# Patient Record
Sex: MALE | Race: AMERICAN INDIAN OR ALASKA NATIVE
[De-identification: names, ages, dates, MRNs, and addresses within clinical notes are randomized per-mention and may not be internally consistent; named-entity substitution may affect disease eponyms.]

---

## 2017-09-29 ENCOUNTER — HOSPITAL ENCOUNTER (INPATIENT)
Dept: HOSPITAL 42 - ED | Age: 52
LOS: 2 days | Discharge: NURSING, REHAB OR LONG-TERM CARE | DRG: 544 | End: 2017-10-01
Attending: HOSPITALIST | Admitting: INTERNAL MEDICINE
Payer: MEDICAID

## 2017-09-29 VITALS — BODY MASS INDEX: 25.4 KG/M2

## 2017-09-29 DIAGNOSIS — I13.2: Primary | ICD-10-CM

## 2017-09-29 DIAGNOSIS — E11.22: ICD-10-CM

## 2017-09-29 DIAGNOSIS — I27.20: ICD-10-CM

## 2017-09-29 DIAGNOSIS — E83.39: ICD-10-CM

## 2017-09-29 DIAGNOSIS — I16.1: ICD-10-CM

## 2017-09-29 DIAGNOSIS — D64.9: ICD-10-CM

## 2017-09-29 DIAGNOSIS — Z79.82: ICD-10-CM

## 2017-09-29 DIAGNOSIS — Z79.01: ICD-10-CM

## 2017-09-29 DIAGNOSIS — I50.21: ICD-10-CM

## 2017-09-29 DIAGNOSIS — E78.00: ICD-10-CM

## 2017-09-29 DIAGNOSIS — N25.81: ICD-10-CM

## 2017-09-29 DIAGNOSIS — N18.6: ICD-10-CM

## 2017-09-29 DIAGNOSIS — Z79.02: ICD-10-CM

## 2017-09-29 DIAGNOSIS — M89.9: ICD-10-CM

## 2017-09-29 DIAGNOSIS — I42.0: ICD-10-CM

## 2017-09-29 DIAGNOSIS — Z99.2: ICD-10-CM

## 2017-09-29 DIAGNOSIS — I34.0: ICD-10-CM

## 2017-09-29 DIAGNOSIS — I25.10: ICD-10-CM

## 2017-09-29 LAB
ALBUMIN/GLOB SERPL: 1.1 {RATIO} (ref 1.1–1.8)
ALP SERPL-CCNC: 114 U/L (ref 38–126)
ALT SERPL-CCNC: 14 U/L (ref 7–56)
APTT BLD: 58 SECONDS (ref 23.7–30.8)
AST SERPL-CCNC: 30 U/L (ref 17–59)
BASOPHILS # BLD AUTO: 0.03 K/MM3 (ref 0–2)
BASOPHILS NFR BLD: 0.5 % (ref 0–3)
BILIRUB SERPL-MCNC: 0.9 MG/DL (ref 0.2–1.3)
BUN SERPL-MCNC: 42 MG/DL (ref 7–21)
CALCIUM SERPL-MCNC: 10.6 MG/DL (ref 8.4–10.5)
CHLORIDE SERPL-SCNC: 97 MMOL/L (ref 98–107)
CO2 SERPL-SCNC: 31 MMOL/L (ref 21–33)
EOSINOPHIL # BLD: 0.1 10*3/UL (ref 0–0.7)
EOSINOPHIL NFR BLD: 1.5 % (ref 1.5–5)
ERYTHROCYTE [DISTWIDTH] IN BLOOD BY AUTOMATED COUNT: 23.2 % (ref 11.5–14.5)
GLOBULIN SER-MCNC: 4 GM/DL
GLUCOSE SERPL-MCNC: 81 MG/DL (ref 70–110)
GRANULOCYTES # BLD: 5.05 10*3/UL (ref 1.4–6.5)
GRANULOCYTES NFR BLD: 81.7 % (ref 50–68)
HCT VFR BLD CALC: 28.8 % (ref 42–52)
INR PPP: 3.68 (ref 0.93–1.08)
LIPASE SERPL-CCNC: 12 U/L (ref 23–300)
LYMPHOCYTES # BLD: 0.4 10*3/UL (ref 1.2–3.4)
LYMPHOCYTES NFR BLD AUTO: 6.1 % (ref 22–35)
MCH RBC QN AUTO: 24 PG (ref 25–35)
MCHC RBC AUTO-ENTMCNC: 32.3 G/DL (ref 31–37)
MCV RBC AUTO: 74.4 FL (ref 80–105)
MONOCYTES # BLD AUTO: 0.6 10*3/UL (ref 0.1–0.6)
MONOCYTES NFR BLD: 10.2 % (ref 1–6)
PLATELET # BLD: 111 10^3/UL (ref 120–450)
POTASSIUM SERPL-SCNC: 4.2 MMOL/L (ref 3.6–5)
PROT SERPL-MCNC: 8.2 G/DL (ref 5.8–8.3)
SODIUM SERPL-SCNC: 143 MMOL/L (ref 132–148)
TROPONIN I SERPL-MCNC: 0.12 NG/ML
WBC # BLD AUTO: 6.2 10^3/UL (ref 4.5–11)

## 2017-09-29 PROCEDURE — 5A1D70Z PERFORMANCE OF URINARY FILTRATION, INTERMITTENT, LESS THAN 6 HOURS PER DAY: ICD-10-PCS

## 2017-09-29 NOTE — RAD
HISTORY:

HTN  



COMPARISON:

01/02/2014 



FINDINGS:



LUNGS:

There is severe pulmonary venous congestion and prominent central 

vasculature. There is also interstitial pulmonary edema. 



PLEURA:

Suspect small pleural effusions, no pneumothorax apparent. There is 

fluid in the minor fissure. 



CARDIOVASCULAR:

There is persistent moderate cardiomegaly.



OSSEOUS STRUCTURES:

No significant abnormalities.



VISUALIZED UPPER ABDOMEN:

Normal.



OTHER FINDINGS:

None.



IMPRESSION:

Findings are most compatible with mild congestive heart failure.

## 2017-09-29 NOTE — CT
PROCEDURE:  CT HEAD WITHOUT CONTRAST.



HISTORY:

headache, HTN



COMPARISON:

01/02/2014. 



TECHNIQUE:

Axial computed tomography images were obtained through the head/brain 

without intravenous contrast.  



Radiation dose:



Total exam DLP = 1972.94 mGy-cm.



This CT exam was performed using one or more of the following dose 

reduction techniques: Automated exposure control, adjustment of the 

mA and/or kV according to patient size, and/or use of iterative 

reconstruction technique.



FINDINGS:



HEMORRHAGE:

No intracranial hemorrhage. 



BRAIN:

Again seen are subcortical calcifications in the frontal lobes, more 

conspicuous on the right. There are also several calcifications in 

bilateral basal ganglia and in the dentate nuclei of cerebellar 

hemispheres. There is no mass, mass effect or abnormal extra-axial 

fluid collection.  There are confluent hypodensities in the 

periventricular white matter.



VENTRICLES:

There is moderate age-related global parenchymal volume loss and 

proportionate enlargement of the ventricles and cortical sulci. 



CALVARIUM:

The skull base and calvarium are normal.



PARANASAL SINUSES:

There is mild polypoid mucosal thickening in the maxillary sinuses. 

The remaining included paranasal sinuses are predominantly clear.



MASTOID AIR CELLS:

Predominantly clear.



OTHER FINDINGS:

None.



IMPRESSION:

Confluent periventricular white matter changes are nonspecific and 

could represent moderate chronic microangiopathic changes. Moderate 

age-related global parenchymal volume loss.



Stable subcortical calcifications, worse in the right frontal lobe, 

and stable calcifications in bilateral basal ganglia and dentate 

nuclei of cerebellar hemispheres.

## 2017-09-29 NOTE — CP.PCM.HP
<Glen Bang - Last Filed: 09/29/17 20:17>





History of Present Illness





- History of Present Illness


History of Present Illness: 





Subjective:


Patient is a 52 year old male whose past medical history includes hypertension, 

hyperlipidemia, chronic kidney disease on HD, and anemia was sent here from 

[facility] for evaluation and treatment of elevated blood 

pressures. Patient reports experiencing left lower extremity pain near catheter 

site. Also states he has mild nonradiating lower abdominal pain which is dull 

in nature. Admits to emesis which he states was orange in color x 2. Patient 

denies f/c/ha/dizziness/cp/sob/n/constipation/diarrhea/urinary symptoms.








PMHx:hypertension, hyperlipidemia, chronic kidney disease, and anemia


PSHx: AV fistulas bilateral upper extremities , dialysis catheter left lower 

extremity. 


Allergies: NKDA


Social HX: denies ETOH, denies tobacco, denies illicit drug use


Family Hx: noncontributory


PMD: Dr. Webb











Physical Examination:





Constitutional: No acute distress.


Head: Normocephalic.  Atraumatic.  


Eyes:  Bilateral eye discharge.


ENT:  Moist mucous membranes.


Neck:  Supple.


Cardiovascular:  Regular rate. No murmurs. +s1 +s2


Chest: No tenderness.


Respiratory:  coarse breath sounds bibasilar bases


GI:  mild abdominal tenderness in the lower quadrants without guarding or 

rebound.


Back:  No CVA tendernes


Musculoskeletal:  Left arm edema. Large mass at right antecubital fossa, 

pulsatile, patient identifies as fistula


Skin:  No rash. 


Neurologic:  Alert, no focal deficit, responds to verbal stimuli, moves 

extremities past midline











Assessment and Plan:





Patient is a 52 year old male whose past medical history includes hypertension, 

hyperlipidemia, chronic kidney disease on HD, and anemia was sent here from 

[facility] for evaluation and treatment of elevated blood pressures.





Hypertensive Urgency


- continue hydralazine IV PRN, catapres, nifidiine, valsartan with holding 

parameters 


- BP in ED started 200s/ 120s down to 180s/80s after being given nitro





ESRD on HD


- avoid nephrotoxins


- ok for ACEi/ARB as patient is already on HD


- urine lytes


- Nephrology Consult- Dr. Fox


- continue HD has per nephro recs





Acute Exacerbation of CHF


- CXR ordered by ED- reviewed Findings are most compatible with mild congestive 

heart failure


- BNP in the 68,000


- patient receiving dialysis on admit date, fluid will be taken off


- hold on lasix at this time as patient is not SOB nor were there crackles 

heard on auscultation


- consult cardiology- Dr. Nergo





Vomitting


- resolved as per patient


- will monitor H and H as the emesis was described as orange


- will add zofran prn if patient c/o nausea





Hx of Hypertension


- BP in ED started 200s/ 120s down to 180s/80s after giving nitro


- continue home nifidipine, hydralazine, clonidine, carvedilol


- continue home valsartan for now as patient is on HD





Hx of Hyperlipidemia


- continue home statin





Supratherapeutic INR


- hold home coumadin





Questionable migraine vs seizures


- continue divalproex sodium 





PPX


- subq heparin


- protonix 





- Plan discussed with and approved by attending physician, Dr. Banda. 








Present on Admission





- Present on Admission


Any Indicators Present on Admission: No





Past Patient History





- Tetanus Immunizations


Tetanus Immunization: Unknown





- Past Medical History & Family History


Past Medical History?: Yes





- Past Social History


Smoking Status: Never Smoked





- CARDIAC


Hx Cardiac Disorders: Yes


Hx Congestive Heart Failure: Yes


Hx Hypertension: Yes





- PULMONARY


Hx Respiratory Disorders: No





- NEUROLOGICAL


Hx Neurological Disorder: Yes


Hx Seizures: Yes





- HEENT


Hx HEENT Problems: No





- RENAL


Hx Chronic Kidney Disease: Yes


Hx Dialysis: Yes


Date of Last Dialysis Treatment: 09/27/17


Hx Renal Failure: Yes (ESRD, CKD, on HD for past 30 years)





- ENDOCRINE/METABOLIC


Hx Endocrine Disorders: Yes


Hx Diabetes Mellitus Type 2: Yes





- HEMATOLOGICAL/ONCOLOGICAL


Hx Blood Disorders: Yes


Hx Anemia: Yes





- INTEGUMENTARY


Hx Dermatological Problems: No





- MUSCULOSKELETAL/RHEUMATOLOGICAL


Hx Musculoskeletal Disorders: Yes


Hx Falls: Yes


Other/Comment: lt arm swelling





- GASTROINTESTINAL


Hx Gastrointestinal Disorders: Yes


Hx Diverticulitis: Yes





- GENITOURINARY/GYNECOLOGICAL


Hx Genitourinary Disorders: No





- PSYCHIATRIC


Hx Psychophysiologic Disorder: No


Hx Substance Use: No





- SURGICAL HISTORY


Hx Cardiac Catheterization: Yes


Hx Vascular Access Device: Yes


Other/Comment: right upper arm and left forearm av shunts not in use, pt has 

left chest wall permacath for dialysis





- ANESTHESIA


Hx Anesthesia: Yes


Hx Anesthesia Reactions: No


Hx Malignant Hyperthermia: No





Meds


Allergies/Adverse Reactions: 


 Allergies











Allergy/AdvReac Type Severity Reaction Status Date / Time


 


No Known Allergies Allergy   Verified 09/29/17 12:08














Results





- Vital Signs


Recent Vital Signs: 





 Last Vital Signs











Temp  98.2 F   09/29/17 12:01


 


Pulse  72   09/29/17 18:03


 


Resp  18   09/29/17 18:03


 


BP  185/89 H  09/29/17 18:03


 


Pulse Ox  100   09/29/17 18:03














- Labs


Result Diagrams: 


 09/29/17 13:03





 09/29/17 13:03


Labs: 





 Laboratory Results - last 24 hr











  09/29/17 09/29/17 09/29/17





  13:03 13:03 13:03


 


WBC   6.2 


 


RBC   3.87 


 


Hgb   9.3 L 


 


Hct   28.8 L 


 


MCV   74.4 L 


 


MCH   24.0 L 


 


MCHC   32.3 


 


RDW   23.2 H 


 


Plt Count   111 L 


 


Gran %   81.7 H 


 


Lymph % (Auto)   6.1 L 


 


Mono % (Auto)   10.2 H 


 


Eos % (Auto)   1.5 


 


Baso % (Auto)   0.5 


 


Gran #   5.05 


 


Lymph #   0.4 L 


 


Mono #   0.6 


 


Eos #   0.1 


 


Baso #   0.03 


 


PT    39.7 H*


 


INR    3.68 H*


 


APTT    58.0 H


 


Sodium  143  


 


Potassium  4.2  


 


Chloride  97 L  


 


Carbon Dioxide  31  


 


Anion Gap  19  


 


BUN  42 H  


 


Creatinine  6.7 H  


 


Est GFR ( Amer)  11  


 


Est GFR (Non-Af Amer)  9  


 


Random Glucose  81  


 


Calcium  10.6 H  


 


Total Bilirubin  0.9  


 


AST  30  


 


ALT  14  


 


Alkaline Phosphatase  114  


 


Troponin I  0.12  D  


 


NT-Pro-B Natriuret Pep  34542 H  


 


Total Protein  8.2  


 


Albumin  4.2  


 


Globulin  4.0  


 


Albumin/Globulin Ratio  1.1  


 


Lipase  12 L  














<GloverVeronika N - Last Filed: 09/30/17 23:15>





Results





- Vital Signs


Recent Vital Signs: 





 Last Vital Signs











Temp  98.9 F   09/30/17 11:59


 


Pulse  92 H  09/30/17 22:00


 


Resp  18   09/30/17 11:59


 


BP  122/73   09/30/17 19:05


 


Pulse Ox  98   09/30/17 06:00














- Labs


Result Diagrams: 


 09/30/17 06:00





 09/30/17 06:00


Labs: 





 Laboratory Results - last 24 hr











  09/30/17 09/30/17





  06:00 06:00


 


WBC  4.5  D 


 


RBC  3.54 


 


Hgb  8.2 L 


 


Hct  25.8 L 


 


MCV  72.9 L 


 


MCH  23.2 L 


 


MCHC  31.8 


 


RDW  22.1 H 


 


Plt Count  128 


 


Gran %  65.7 


 


Lymph % (Auto)  14.7 L 


 


Mono % (Auto)  14.9 H 


 


Eos % (Auto)  4.0 


 


Baso % (Auto)  0.7 


 


Gran #  2.96 


 


Lymph #  0.7 L 


 


Mono #  0.7 H 


 


Eos #  0.2 


 


Baso #  0.03 


 


Sodium   142


 


Potassium   4.1


 


Chloride   97 L


 


Carbon Dioxide   33


 


Anion Gap   16


 


BUN   37 H


 


Creatinine   5.7 H


 


Est GFR ( Amer)   13


 


Est GFR (Non-Af Amer)   10


 


Random Glucose   103


 


Calcium   9.7


 


Phosphorus   4.1


 


Magnesium   2.0


 


Total Bilirubin   1.0


 


AST   22


 


ALT   18


 


Alkaline Phosphatase   94


 


Troponin I   0.14 H*


 


Total Protein   7.3


 


Albumin   3.7


 


Globulin   3.6


 


Albumin/Globulin Ratio   1.0 L


 


Triglycerides   97


 


Cholesterol   122 L


 


LDL Cholesterol Direct   37


 


HDL Cholesterol   36

## 2017-09-29 NOTE — ED PDOC
Arrival/HPI





- General


Chief Complaint: Headache


Time Seen by Provider: 09/29/17 12:01


Historian: Patient





- History of Present Illness


Narrative History of Present Illness (Text): 


09/29/17 12:04


A 52 year old male, whose past medical history includes hypertension, 

hyperlipidemia, chronic kidney disease, and anemia, was sent here from [facility] for hypertension evaluation. NH staff report patient was given flu shot 

yesterday and today was noted to have rhinorrhea, severe Hypertension. Patiet 

reports shortness of breath. Patient due for dialysis today. 





PMD: Dr. Webb





Time/Duration: Prior to Arrival


Symptom Onset: Gradual


Symptom Course: Unchanged





Past Medical History





- Provider Review


Nursing Documentation Reviewed: Yes





- Tetanus Immunization


Tetanus Immunization: Unknown





- Cardiac


Hx Cardiac Disorders: Yes


Hx Congestive Heart Failure: Yes


Hx Hypertension: Yes





- Pulmonary


Hx Respiratory Disorders: No





- Neurological


Hx Neurological Disorder: Yes


Hx Seizures: Yes





- HEENT


Hx HEENT Disorder: No





- Renal


Hx Renal Disorder: Yes


Hx Dialysis: Yes


Date of Last Dialysis Treatment: 09/27/17


Hx Renal Failure: Yes (ESRD, CKD, on HD for past 30 years)





- Endocrine/Metabolic


Hx Endocrine Disorders: Yes


Hx Diabetes Mellitus Type 2: Yes





- Hematological/Oncological


Hx Blood Disorders: Yes


Hx Anemia: Yes





- Integumentary


Hx Dermatological Disorder: No





- Musculoskeletal/Rheumatological


Hx Musculoskeletal Disorders: Yes


Hx Falls: Yes


Other/Comment: lt arm swelling





- Gastrointestinal


Hx Gastrointestinal Disorders: Yes


Hx Diverticulitis: Yes





- Genitourinary/Gynecological


Hx Genitourinary Disorders: No





- Psychiatric


Hx Psychophysiologic Disorder: No


Hx Substance Use: No





- Past Surgical History


Past Surgical History: Unable to Obtain





- Surgical History


Hx Cardiac Catheterization: Yes


Hx Vascular Access Device: Yes


Other/Comment: right upper arm and left forearm av shunts not in use, pt has 

left chest wall permacath for dialysis





- Anesthesia


Hx Anesthesia: Yes


Hx Anesthesia Reactions: No


Hx Malignant Hyperthermia: No





- Suicidal Assessment


Feels Threatened In Home Enviroment: No





Family/Social History





- Physician Review


Nursing Documentation Reviewed: Yes


Family/Social History: No Known Family HX


Smoking Status: Never Smoked


Hx Alcohol Use: No


Hx Substance Use: No





Allergies/Home Meds


Allergies/Adverse Reactions: 


Allergies





No Known Allergies Allergy (Verified 09/29/17 12:08)


 








Home Medications: 


 Home Meds











 Medication  Instructions  Recorded  Confirmed


 


Aspirin [Aspirin EC] 81 mg PO DAILY 09/21/15 09/29/17


 


Atorvastatin [Lipitor] 20 mg PO DIN 09/21/15 09/29/17


 


Carvedilol [Coreg] 25 mg PO BID 09/21/15 09/29/17


 


Clopidogrel [Plavix] 75 mg PO DAILY 09/21/15 09/29/17


 


Amino Acids/Protein Hydr/Fiber 30 ml PO BID 09/29/17 09/29/17





[Pro-Stat Renal Care Liquid]   


 


Calcium Acetate [Phoslo] 2 cap PO TID 09/29/17 09/29/17


 


Divalproex Sodium [Divalproex 1 cap PO BID 09/29/17 09/29/17





Sodium]   


 


Famotidine [Pepcid] 20 mg PO DAILY 09/29/17 09/29/17


 


NIFEdipine ER [Procardia XL] 1 tab PO DAILY 09/29/17 09/29/17


 


Valsartan [Diovan] 320 mg PO DAILY 09/29/17 09/29/17


 


Warfarin Sodium [Coumadin] 6 mg PO DAILY 09/29/17 09/29/17


 


cloNIDine [Catapres] 0.1 mg PO BID 09/29/17 09/29/17


 


hydrALAZINE [Apresoline] 100 mg PO BID 09/29/17 09/29/17














Review of Systems





- Physician Review


All systems were reviewed & negative as marked: Yes





- Review of Systems


Constitutional: absent: Fevers


Cardiovascular: absent: Chest Pain





Physical Exam





- Physical Exam


Narrative Physical Exam (Text): 





Constitutional: No acute distress.


Head: Normocephalic.  Atraumatic.  


Eyes:  Bilateral eye discharge.


ENT:  Moist mucous membranes.


Neck:  Supple.


Cardiovascular:  Regular rate. No murmurs.


Chest: No tenderness.


Respiratory:  Clear to auscultation bilaterally.


GI:  Diffuse abdomen tenderness without guarding or rebound.


Back:  No CVA tenderness.


Musculoskeletal:  Left arm edema. Large mass at right antecubital fossa, 

pulsatile, patient identifies as fistula.


Skin:  No rash. 


Neurologic:  Alert, no focal deficit.








Vital Signs Reviewed: Yes


Vital Signs











  Temp Pulse Resp BP Pulse Ox


 


 09/29/17 18:03   72  18  185/89 H  100


 


 09/29/17 16:40   77  18  188/93 H  96


 


 09/29/17 15:33   87  20  196/102 H  100


 


 09/29/17 14:11   82  18  200/124 H  93 L


 


 09/29/17 12:01  98.2 F  85  18  204/130 H  95











Temperature: Afebrile


Blood Pressure: Hypertensive


Pulse: Regular


Respiratory Rate: Normal


Appearance: Positive for: Well-Appearing


Pain Distress: None


Mental Status: Positive for: Alert and Oriented X 3





Medical Decision Making


ED Course and Treatment: 


09/29/17 12:09


Impression:  52 year old male brought in for hypertension evaluation and is 

complaining of abdominal pain. Physical exam shows left arm edema; large mass 

at right antecubital fossa, pulsatile, patient identifies as fistula; diffused 

abdominal tenderness without guarding or rebound; no murmurs; bilateral eye 

discharge.





Plan:


-- EKG


-- Head CT


-- Chest X-ray 


-- Labs


-- Reassess and disposition





Prior Visits:


Notes and results from previous visits were reviewed. Patient was last seen on 

09/21/2015 for fever. Patient was admitted.





Progress Notes:


EKG:


Ordered, reviewed, and independently interpreted the EKG.


Rate : 85 BPM


Rhythm : NSR


Interpretation : No ST-segment elevations, rightward axis ST depression; 2, 3 

AVMs.


Comparison : No previous EKG for comparison.





09/29/2017 12:27


Chest X-ray


IMPRESSION: Findings are most compatible with mild congestive heart failure.


Dictator: Iris Judge MD








09/29/2017 13:40


Head CT


IMPRESSION: 


Confluent perventricular white matter changes are nospecific and could 

represent moderate chronic microangiopathic changes. Moderate age-related 

global parenchymal volume loss. 


Stable subcortical calcifications, worse in the right frontal lobe, and stable 

calcifications in bilateral basal ganglia and dentate nuclei of cerebellar 

hemispheres.


Dictator: Iris Rodgers approved patient transport to [facility], recommends hospitalist for 

admission. Dr. Ribeiro accepts to hospitalist service. Dr. Fox consulted for 

renal, arranged for dialysis at bedside in emergency department.








- Lab Interpretations


Lab Results: 








 09/29/17 13:03 





 09/29/17 13:03 





 Lab Results





09/29/17 13:03: PT 39.7 H*, INR 3.68 H*, APTT 58.0 H


09/29/17 13:03: WBC 6.2, RBC 3.87, Hgb 9.3 L, Hct 28.8 L, MCV 74.4 L, MCH 24.0 L

, MCHC 32.3, RDW 23.2 H, Plt Count 111 L, Gran % 81.7 H, Lymph % (Auto) 6.1 L, 

Mono % (Auto) 10.2 H, Eos % (Auto) 1.5, Baso % (Auto) 0.5, Gran # 5.05, Lymph # 

0.4 L, Mono # 0.6, Eos # 0.1, Baso # 0.03


09/29/17 13:03: Sodium 143, Potassium 4.2, Chloride 97 L, Carbon Dioxide 31, 

Anion Gap 19, BUN 42 H, Creatinine 6.7 H, Est GFR ( Amer) 11, Est GFR (

Non-Af Amer) 9, Random Glucose 81, Calcium 10.6 H, Total Bilirubin 0.9, AST 30, 

ALT 14, Alkaline Phosphatase 114, Troponin I 0.12  D, NT-Pro-B Natriuret Pep 

06966 H, Total Protein 8.2, Albumin 4.2, Globulin 4.0, Albumin/Globulin Ratio 

1.1, Lipase 12 L








I have reviewed the lab results: Yes





- RAD Interpretation


Radiology Orders: 








09/29/17 12:02


HEAD W/O CONTRAST [CT] Stat 


CHEST PORTABLE [RAD] Stat 














- Medication Orders


Current Medication Orders: 











Discontinued Medications





Nitroglycerin (Nitrostat Sl Tab)  0.4 mg SL STAT STA


   Stop: 09/29/17 12:32


   Last Admin: 09/29/17 13:00  Dose: 0.4 mg











- Scribe Statement


The provider has reviewed the documentation as recorded by the Russ Hassan





Provider Scribe Attestation:


All medical record entries made by the Russ were at my direction and 

personally dictated by me. I have reviewed the chart and agree that the record 

accurately reflects my personal performance of the history, physical exam, 

medical decision making, and the department course for this patient. I have 

also personally directed, reviewed, and agree with the discharge instructions 

and disposition.











Disposition/Present on Arrival





- Present on Arrival


Any Indicators Present on Arrival: No


History of DVT/PE: No


History of Uncontrolled Diabetes: No


Urinary Catheter: No


History of Decub. Ulcer: No


History Surgical Site Infection Following: None





- Disposition


Have Diagnosis and Disposition been Completed?: Yes


Diagnosis: 


 Fluid overload, Hypertensive emergency





Disposition: HOSPITALIZED


Disposition Time: 13:51


Patient Plan: Admission, Telemetry


Condition: GUARDED


Referrals: 


Laura Gallo MD [Primary Care Provider] - Follow up with primary


Forms:  [employer] (English)

## 2017-09-29 NOTE — CP.PCM.CON
History of Present Illness





- History of Present Illness


History of Present Illness: 


History taken mainly from patient records as patient is poor historian;





53 yo M w/ pmh of htn, ESRD on HD (MWF, still obtaining info regarding 

outpatient center, nephrologist per previous record in Dr. Gonzalez), was sent 

from [facility] for severely elevated BP; patient also 

reportedly short of breath since past 2 days;





On presentation to ED, found to be in hypertensive emergency with pulmonary 

edema, given nitro patch and placed on BIPAP with improvement in symptoms;





PMH: ESRD since age 20; central venous stenosis; CAD s/p cath (2015);





Social hx: non-smoker





Fam hx: unobtainable








Review of Systems





- Review of Systems


Systems not reviewed;Unavailable: Other (poor historian, also on BIPAP due to 

respiratory distress)





- EENT


Eyes: absent: Change in Vision





- Cardiovascular


Cardiovascular: Dyspnea





- Gastrointestinal


Gastrointestinal: Nausea, Vomiting





Past Patient History





- Tetanus Immunizations


Tetanus Immunization: Unknown





- Past Medical History & Family History


Past Medical History?: Yes





- Past Social History


Smoking Status: Never Smoked





- CARDIAC


Hx Cardiac Disorders: Yes


Hx Congestive Heart Failure: Yes


Hx Hypertension: Yes





- PULMONARY


Hx Respiratory Disorders: No





- NEUROLOGICAL


Hx Neurological Disorder: Yes


Hx Seizures: Yes





- HEENT


Hx HEENT Problems: No





- RENAL


Hx Chronic Kidney Disease: Yes


Hx Dialysis: Yes


Date of Last Dialysis Treatment: 09/27/17


Hx Renal Failure: Yes (ESRD, CKD, on HD for past 30 years)





- ENDOCRINE/METABOLIC


Hx Endocrine Disorders: Yes


Hx Diabetes Mellitus Type 2: Yes





- HEMATOLOGICAL/ONCOLOGICAL


Hx Blood Disorders: Yes


Hx Anemia: Yes





- INTEGUMENTARY


Hx Dermatological Problems: No





- MUSCULOSKELETAL/RHEUMATOLOGICAL


Hx Musculoskeletal Disorders: Yes


Hx Falls: Yes


Other/Comment: lt arm swelling





- GASTROINTESTINAL


Hx Gastrointestinal Disorders: Yes


Hx Diverticulitis: Yes





- GENITOURINARY/GYNECOLOGICAL


Hx Genitourinary Disorders: No





- PSYCHIATRIC


Hx Psychophysiologic Disorder: No


Hx Substance Use: No





- SURGICAL HISTORY


Hx Cardiac Catheterization: Yes


Hx Vascular Access Device: Yes


Other/Comment: right upper arm and left forearm av shunts not in use, pt has 

left chest wall permacath for dialysis





- ANESTHESIA


Hx Anesthesia: Yes


Hx Anesthesia Reactions: No


Hx Malignant Hyperthermia: No





Meds


Allergies/Adverse Reactions: 


 Allergies











Allergy/AdvReac Type Severity Reaction Status Date / Time


 


No Known Allergies Allergy   Verified 09/29/17 12:08














- Medications


Medications: 


 Current Medications





Aspirin (Ecotrin)  81 mg PO DAILY [facility]


Atorvastatin Calcium (Lipitor)  20 mg PO DIN [facility]


Calcium Acetate (Phoslo)  1,334 mg PO TID [facility]


Carvedilol (Coreg)  25 mg PO BID [facility]


Clonidine HCl (Catapres)  0.1 mg PO BID [facility]


Divalproex Sodium (Depakote Sprinkles)  125 mg PO BID [facility]


   PRN Reason: Protocol


Heparin Sodium (Porcine) (Heparin)  5,000 units SC Q12 [facility]


   PRN Reason: Protocol


   Last Admin: 09/29/17 23:22 Dose:  5,000 units


Hydralazine HCl (Apresoline)  100 mg PO BID [facility]


Nifedipine (Procardia Xl)  60 mg PO DAILY [facility]


Non-Formulary Medication (Amino Acids/Protein Hydr/Fiber [Pro-Stat Renal Care 

Liquid])  30 ml PO BID [facility]


Pantoprazole Sodium (Protonix Ec Tab)  40 mg PO DAILY [facility]


Valsartan (Diovan)  320 mg PO DAILY [facility]











Physical Exam





- Constitutional


Appears: Non-toxic, No Acute Distress





- Eye Exam


Eye Exam: Normal appearance.  absent: Scleral icterus





- ENT Exam


ENT Exam: Mucous Membranes Moist





- Neck Exam


Additional comments: 





distended neck veins;





- Respiratory Exam


Respiratory Exam: absent: Rhonchi, Wheezes


Additional comments: 





rales present;





- Cardiovascular Exam


Cardiovascular Exam: REGULAR RHYTHM, +S1, +S2





- GI/Abdominal Exam


GI & Abdominal Exam: Soft.  absent: Distended, Tenderness





-  Exam


 Exam: absent: Bladder Distension





- Extremities Exam


Additional comments: 





mild lower ext edema; L arm edematous; R arm with large aneurysmal dilatation 

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





- Neurological Exam


Neurological exam: Alert





- Psychiatric Exam


Psychiatric exam: Normal Mood





- Skin


Skin Exam: Normal Color, Warm





Results





- Vital Signs


Recent Vital Signs: 


 Last Vital Signs











Temp  98.0 F   09/29/17 21:29


 


Pulse  79   09/29/17 21:29


 


Resp  20   09/29/17 22:20


 


BP  161/95 H  09/29/17 21:29


 


Pulse Ox  97   09/29/17 22:20














- Labs


Result Diagrams: 


 09/30/17 06:00





 09/30/17 06:00





- Imaging and Cardiology


  ** Chest x-ray


Status: Image reviewed by me


Additional comment: 





pulmonary edema;





Assessment & Plan


(1) Acute decompensated heart failure


Assessment and Plan: 


With borderline systolic dysfunction and diastolic dysfunction on previous echo

; will benefit from UF on HD urgently; agree with repeat echo;


Status: Acute   





(2) ESRD needing dialysis


Assessment and Plan: 


Due for HD today; volume overloaded; otherwise stable electrolyte status; 2 hr 

HD session urgently this evening with UF goal 2.5L net; full HD session tomorrow

;


Status: Acute   





(3) Hypertensive emergency


Assessment and Plan: 


In the setting of volume overload; BP better controlled after getting nitro; 

agree with restarting home meds; UF will help;


Status: Acute   





(4) Chronic kidney disease-mineral and bone disorder


Assessment and Plan: 


Phos controlled; continue phoslo 2 tabs with meals;


Status: Chronic   





(5) Anemia


Assessment and Plan: 


Hgb below goal; patient coagulopathic as well; transfuse if drops further;


Status: Acute

## 2017-09-30 LAB
ALBUMIN/GLOB SERPL: 1 {RATIO} (ref 1.1–1.8)
ALP SERPL-CCNC: 94 U/L (ref 38–126)
ALT SERPL-CCNC: 18 U/L (ref 7–56)
AST SERPL-CCNC: 22 U/L (ref 17–59)
BASOPHILS # BLD AUTO: 0.03 K/MM3 (ref 0–2)
BASOPHILS NFR BLD: 0.7 % (ref 0–3)
BILIRUB SERPL-MCNC: 1 MG/DL (ref 0.2–1.3)
BUN SERPL-MCNC: 37 MG/DL (ref 7–21)
CALCIUM SERPL-MCNC: 9.7 MG/DL (ref 8.4–10.5)
CHLORIDE SERPL-SCNC: 97 MMOL/L (ref 98–107)
CHOLEST SERPL-MCNC: 122 MG/DL (ref 130–200)
CO2 SERPL-SCNC: 33 MMOL/L (ref 21–33)
EOSINOPHIL # BLD: 0.2 10*3/UL (ref 0–0.7)
EOSINOPHIL NFR BLD: 4 % (ref 1.5–5)
ERYTHROCYTE [DISTWIDTH] IN BLOOD BY AUTOMATED COUNT: 22.1 % (ref 11.5–14.5)
GLOBULIN SER-MCNC: 3.6 GM/DL
GLUCOSE SERPL-MCNC: 103 MG/DL (ref 70–110)
GRANULOCYTES # BLD: 2.96 10*3/UL (ref 1.4–6.5)
GRANULOCYTES NFR BLD: 65.7 % (ref 50–68)
HCT VFR BLD CALC: 25.8 % (ref 42–52)
LYMPHOCYTES # BLD: 0.7 10*3/UL (ref 1.2–3.4)
LYMPHOCYTES NFR BLD AUTO: 14.7 % (ref 22–35)
MAGNESIUM SERPL-MCNC: 2 MG/DL (ref 1.7–2.2)
MCH RBC QN AUTO: 23.2 PG (ref 25–35)
MCHC RBC AUTO-ENTMCNC: 31.8 G/DL (ref 31–37)
MCV RBC AUTO: 72.9 FL (ref 80–105)
MONOCYTES # BLD AUTO: 0.7 10*3/UL (ref 0.1–0.6)
MONOCYTES NFR BLD: 14.9 % (ref 1–6)
PHOSPHATE SERPL-MCNC: 4.1 MG/DL (ref 2.5–4.5)
PLATELET # BLD: 128 10^3/UL (ref 120–450)
POTASSIUM SERPL-SCNC: 4.1 MMOL/L (ref 3.6–5)
PROT SERPL-MCNC: 7.3 G/DL (ref 5.8–8.3)
SODIUM SERPL-SCNC: 142 MMOL/L (ref 132–148)
TROPONIN I SERPL-MCNC: 0.14 NG/ML
WBC # BLD AUTO: 4.5 10^3/UL (ref 4.5–11)

## 2017-09-30 RX ADMIN — DIVALPROEX SODIUM SCH MG: 125 CAPSULE ORAL at 09:38

## 2017-09-30 RX ADMIN — DIVALPROEX SODIUM SCH MG: 125 CAPSULE ORAL at 19:06

## 2017-09-30 RX ADMIN — NIFEDIPINE SCH MG: 60 TABLET, FILM COATED, EXTENDED RELEASE ORAL at 09:38

## 2017-09-30 RX ADMIN — PANTOPRAZOLE SODIUM SCH MG: 40 TABLET, DELAYED RELEASE ORAL at 09:39

## 2017-09-30 NOTE — CARD
--------------- APPROVED REPORT --------------





EKG Measurement

Heart [id number]

NC [id number]

[id number]

[id number]

[id number]



<Conclusion>

Normal sinus rhythm

Rightward axis

PRWP

IVCD

STTW changes c/w ischemia

## 2017-09-30 NOTE — CP.PCM.PN
<Jillian Hightower - Last Filed: 10/01/17 16:59>





Subjective





- Date & Time of Evaluation


Date of Evaluation: 09/30/17


Time of Evaluation: 11:00





- Subjective


Subjective: 





Hospitalist Service Progress Note:





Patient seen and examined at bedside. Per nursing no acute events overnight. 

Patient is tolerating diet, without complaints at this time. Denies headaches, 

dizziness, cp, palpitations, sob, changes in bowel habits. Went for HD today, 

tolerated well. 





Objective





- Vital Signs/Intake and Output


Vital Signs (last 24 hours): 


 











Temp Pulse Resp BP Pulse Ox


 


 98.9 F   81   18   163/86 H  98 


 


 09/30/17 11:59  09/30/17 11:59  09/30/17 11:59  09/30/17 11:59  09/30/17 06:00








Intake and Output: 


 











 09/30/17 09/30/17





 06:59 18:59


 


Intake Total 118 


 


Balance 118 














- Medications


Medications: 


 Current Medications





Aspirin (Ecotrin)  81 mg PO DAILY [facility]


   Last Admin: 09/30/17 09:38 Dose:  81 mg


Atorvastatin Calcium (Lipitor)  20 mg PO DIN [facility]


Calcium Acetate (Phoslo)  1,334 mg PO TID [facility]


   Last Admin: 09/30/17 13:19 Dose:  1,334 mg


Carvedilol (Coreg)  25 mg PO BID [facility]


   Last Admin: 09/30/17 09:39 Dose:  25 mg


Clonidine HCl (Catapres)  0.1 mg PO BID [facility]


   Last Admin: 09/30/17 09:39 Dose:  0.1 mg


Divalproex Sodium (Depakote Sprinkles)  125 mg PO BID [facility]


   PRN Reason: Protocol


   Last Admin: 09/30/17 09:38 Dose:  125 mg


Heparin Sodium (Porcine) (Heparin)  5,000 units SC Q12 [facility]


   PRN Reason: Protocol


   Last Admin: 09/30/17 13:19 Dose:  5,000 units


Hydralazine HCl (Apresoline)  100 mg PO BID [facility]


   Last Admin: 09/30/17 09:37 Dose:  100 mg


Nifedipine (Procardia Xl)  60 mg PO DAILY [facility]


   Last Admin: 09/30/17 09:38 Dose:  60 mg


Non-Formulary Medication (Amino Acids/Protein Hydr/Fiber [Pro-Stat Renal Care 

Liquid])  30 ml PO BID [facility]


   Last Admin: 09/30/17 10:00 Dose:  Not Given


Pantoprazole Sodium (Protonix Ec Tab)  40 mg PO DAILY [facility]


   Last Admin: 09/30/17 09:39 Dose:  40 mg


Valsartan (Diovan)  320 mg PO DAILY [facility]


   Last Admin: 09/30/17 09:38 Dose:  320 mg


Vitamin B Complex/Vit C/Folic Acid (Nephro-Carl)  1 tab PO 0800 [facility]











- Labs


Labs: 


 





 09/30/17 06:00 





 09/30/17 06:00 





 











PT  39.7 Seconds (9.9-11.8)  H*  09/29/17  13:03    


 


INR  3.68  (0.93-1.08)  H*  09/29/17  13:03    


 


APTT  58.0 Seconds (23.7-30.8)  H  09/29/17  13:03    














- Constitutional


Appears: No Acute Distress, Older Than Stated Age, Chronically Ill





- Head Exam


Head Exam: ATRAUMATIC, NORMAL INSPECTION





- Eye Exam


Eye Exam: EOMI, Normal appearance


Pupil Exam: NORMAL ACCOMODATION





- ENT Exam


ENT Exam: Mucous Membranes Moist





- Neck Exam


Neck Exam: Full ROM





- Respiratory Exam


Respiratory Exam: Decreased Breath Sounds, NORMAL BREATHING PATTERN.  absent: 

Rales, Rhonchi, Wheezes





- Cardiovascular Exam


Cardiovascular Exam: REGULAR RHYTHM, +S1, +S2





- GI/Abdominal Exam


GI & Abdominal Exam: Soft.  absent: Guarding, Rigid, Tenderness





- Extremities Exam


Additional comments: 





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


L arm swelling changes


Dialysis catheter located in L femoral region 





- Neurological Exam


Neurological Exam: Alert, Awake, Oriented x3


Neuro motor strength exam: Left Upper Extremity: 4, Right Upper Extremity: 5, 

Left Lower Extremity: 5, Right Lower Extremity: 5





- Psychiatric Exam


Psychiatric exam: Normal Affect, Normal Mood





- Skin


Skin Exam: Dry, Normal Color, Warm





Assessment and Plan





- Assessment and Plan (Free Text)


Assessment: 





Patient is a 52 year old male whose past medical history includes hypertension, 

hyperlipidemia, chronic kidney disease on HD, and anemia was sent here from 

[facility] for evaluation and treatment of elevated blood pressures.


 





Plan: 





1. Hypertensive Emergency


- Presented with pulmonary edema which improved with bipap


- Currently normatensive


- Continue hydralazine, coreg, clonidine, procardia, valsartan with holding 

parameters 





2. ESRD on HD (MWF)


- Went for HD today 


- avoid nephrotoxins


- Nephrology Consult- Dr. Adame


- continue HD has per nephro recs





3. Acute Exacerbation of CHF


- CXR ordered by ED- reviewed Findings are most compatible with mild congestive 

heart failure


- BNP in the 68,000


- patient receiving dialysis on admit date, fluid will be taken off


- consult cardiology- Dr. Negro





4. Vomiting


- resolved as per patient


- currently tolerating diet


- zofran prn nausea





5. Hx of Hypertension


- BP in ED started 200s/ 120s down to 180s/80s after giving nitro


- continue home nifidipine, hydralazine, clonidine, carvedilol


- continue home valsartan for now as patient is on HD





6. Hx of Hyperlipidemia


- continue home statin





7. Supratherapeutic INR


- hold home coumadin


- F/U INR tomorrow 





8. Questionable migraine vs seizures


- continue divalproex sodium 





PPX


- subq heparin


- protonix





<Bassem Glover - Last Filed: 10/09/17 14:13>





Objective





- Vital Signs/Intake and Output


Vital Signs (last 24 hours): 


 











Temp Pulse Resp BP Pulse Ox


 


 98.5 F   83   20   154/83 H  110 H


 


 10/01/17 12:00  10/01/17 12:00  10/01/17 12:00  10/01/17 12:00  10/01/17 06:00











- Labs


Labs: 


 





 10/01/17 06:00 





 10/01/17 06:00 





 











PT  26.0 Seconds (9.9-11.8)  H  10/01/17  10:20    


 


INR  2.41  (0.93-1.08)  H  10/01/17  10:20    


 


APTT  58.0 Seconds (23.7-30.8)  H  09/29/17  13:03    














Attending/Attestation





- Attestation


I have personally seen and examined this patient.: Yes


I have fully participated in the care of the patient.: Yes


I have reviewed all pertinent clinical information, including history, physical 

exam and plan: Yes


Notes (Text): 


I have seen and examined the patient at bedside. Agree with the note above with 

the following additions/ exceptions: Briefly this is 52 year old male with 

history of HTN, dyslipidemia, CKD on HD and anemia who was sent from [facility] for evaluation of hypertensive emergency which has resolved now. He is on 

multiple BP meds. He also underwent HD. INR is supratherapeutic. He is on 

coumadin for unknown reason. Upon discharge patient will follow up with Dr Gallo.


Dr Bassem Glover

## 2017-09-30 NOTE — CON
DATE:



HISTORY OF PRESENT ILLNESS:  The patient is a 52-year-old male who is

brought here from subacute rehab for accelerated hypertension.  The

patient's past medical history consist of end-stage treated with dialysis. 

He suffers from hypertension and hypercholesterolemia.  The patient is on

Coumadin for questionable reasons.  He denies chest pain, denies shortness

of breath.



The patient underwent cardiac catheterization in 2015 by Dr. Holbrook at [facility] which shows nonobstructive CAD.



SOCIAL HISTORY:  Denies smoking.



REVIEW OF SYSTEMS:  A 14-point review of systems is free of cardiac

symptomatology.



PHYSICAL EXAMINATION:

VITAL SIGNS:  Blood pressure is 163/101, heart rate in the 80s.

NECK:  Negative JVD.

LUNGS:  Without rales.

HEART:  Reveal S1, S2.

EXTREMITIES:  Without edema.



EKG shows normal sinus rhythm with nonspecific ST-T changes.



LABORATORY:  Troponin is 0.14 with a BUN and creatinine of 37 and 5.7. 

Hemoglobin is 9.3.



IMPRESSION:

1.  Accelerated hypertension.

2.  End-stage renal disease.

3.  Hypercholesterolemia.

4.  Anemia.



Given these findings, the patient has been placed on Procardia as well as

continue his Diovan.  We will continue to observe his blood pressures

within the next 24 hours.





__________________________________________

Jasper Self MD



DD:  09/30/2017 8:05:41

DT:  09/30/2017 8:08:44

Job # [id number]

## 2017-10-01 VITALS
DIASTOLIC BLOOD PRESSURE: 83 MMHG | RESPIRATION RATE: 20 BRPM | TEMPERATURE: 98.5 F | HEART RATE: 83 BPM | SYSTOLIC BLOOD PRESSURE: 154 MMHG

## 2017-10-01 VITALS — OXYGEN SATURATION: 110 %

## 2017-10-01 LAB
ALBUMIN/GLOB SERPL: 0.9 {RATIO} (ref 1.1–1.8)
ALP SERPL-CCNC: 93 U/L (ref 38–126)
ALT SERPL-CCNC: 17 U/L (ref 7–56)
AST SERPL-CCNC: 32 U/L (ref 17–59)
BASOPHILS # BLD AUTO: 0.02 K/MM3 (ref 0–2)
BASOPHILS NFR BLD: 0.7 % (ref 0–3)
BILIRUB SERPL-MCNC: 0.7 MG/DL (ref 0.2–1.3)
BUN SERPL-MCNC: 30 MG/DL (ref 7–21)
CALCIUM SERPL-MCNC: 9.4 MG/DL (ref 8.4–10.5)
CHLORIDE SERPL-SCNC: 98 MMOL/L (ref 98–107)
CO2 SERPL-SCNC: 32 MMOL/L (ref 21–33)
EOSINOPHIL # BLD: 0.4 10*3/UL (ref 0–0.7)
EOSINOPHIL NFR BLD: 11.6 % (ref 1.5–5)
ERYTHROCYTE [DISTWIDTH] IN BLOOD BY AUTOMATED COUNT: 21.9 % (ref 11.5–14.5)
GLOBULIN SER-MCNC: 3.8 GM/DL
GLUCOSE SERPL-MCNC: 88 MG/DL (ref 70–110)
GRANULOCYTES # BLD: 1.61 10*3/UL (ref 1.4–6.5)
GRANULOCYTES NFR BLD: 53.1 % (ref 50–68)
HCT VFR BLD CALC: 24.9 % (ref 42–52)
INR PPP: 2.41 (ref 0.93–1.08)
LYMPHOCYTES # BLD: 0.7 10*3/UL (ref 1.2–3.4)
LYMPHOCYTES NFR BLD AUTO: 22.4 % (ref 22–35)
MCH RBC QN AUTO: 23.5 PG (ref 25–35)
MCHC RBC AUTO-ENTMCNC: 32.1 G/DL (ref 31–37)
MCV RBC AUTO: 73 FL (ref 80–105)
MONOCYTES # BLD AUTO: 0.4 10*3/UL (ref 0.1–0.6)
MONOCYTES NFR BLD: 12.2 % (ref 1–6)
PLATELET # BLD: 113 10^3/UL (ref 120–450)
POTASSIUM SERPL-SCNC: 3.5 MMOL/L (ref 3.6–5)
PROT SERPL-MCNC: 7.4 G/DL (ref 5.8–8.3)
SODIUM SERPL-SCNC: 142 MMOL/L (ref 132–148)
WBC # BLD AUTO: 3 10^3/UL (ref 4.5–11)

## 2017-10-01 RX ADMIN — DIVALPROEX SODIUM SCH MG: 125 CAPSULE ORAL at 11:01

## 2017-10-01 RX ADMIN — NIFEDIPINE SCH MG: 60 TABLET, FILM COATED, EXTENDED RELEASE ORAL at 11:01

## 2017-10-01 RX ADMIN — PANTOPRAZOLE SODIUM SCH MG: 40 TABLET, DELAYED RELEASE ORAL at 11:02

## 2017-10-01 NOTE — CARD
--------------- APPROVED REPORT --------------





EKG Measurement

Heart [id number]

NJ [id number]

[id number]

[id number]

[id number]



<Conclusion>

Sinus rhythm

APC

IVCD

PRWP

STTW changes

No change

## 2017-10-01 NOTE — PN
DATE:  10/01/2017



CARDIOLOGY FOLLOWUP



SUBJECTIVE:  The patient is comfortable in bed.



OBJECTIVE:

VITAL SIGNS:  Blood pressure is 110/58, heart rate in the 60s.

NECK:  Negative JVD.

LUNGS:  Decreased breath sounds.

HEART:  Reveal S1, S2.

EXTREMITIES:  Without change.



LABORATORY DATA:  BUN and creatinine is 30 and 4.6, hemoglobin is 8.0.



Echocardiogram reveals LV hypokinesis with an EF of 30%.



IMPRESSION:

1.  Hypertension which is now well-controlled.

2.  End-stage renal disease.

3.  Hypercholesterolemia.

4.  Anemia.

5.  Dilated cardiomyopathy.



Given these findings, the patient's blood pressure better controlled.  It

is likely the dilated cardiomyopathy is chronic in nature.



Currently the patient went for dialysis yesterday from a cardiac

perspective, no further cardiac workup is necessary.  We will discontinue

telemetry today.





__________________________________________

Jasper Self MD





DD:  10/01/2017 9:11:11

DT:  10/01/2017 9:15:45

Job # [id number]

## 2017-10-01 NOTE — CARD
--------------- APPROVED REPORT --------------





EXAM: Two-dimensional and M-mode echocardiogram with Doppler and 

color Doppler.



Other Information 

Quality : AverageRhythm : 



INDICATION

Congestive Heart Failure 



2D DIMENSIONS 

Left Atrium (2D)4.7   (1.6-4.0cm)IVSd1.4   (0.7-1.1cm)

LVDd5.3   (3.9-5.9cm)PWd1.4   (0.7-1.1cm)

LVDs4.7   (2.5-4.0cm)FS (%) 11.2   %

LVEF (%)30.0   (>50%)



M-Mode DIMENSIONS 

Aortic Root3.00   (2.2-3.7cm)Aortic Cusp Exc.1.60   (1.5-2.0cm)



Aortic Valve

AoV Peak Yrniuxdc699.0cm/Saman Peak GR.19mmHg



Mitral Valve

E/A ratio0.0



TDI

E/Lateral E'0.0E/Medial E'0.0



Tricuspid Valve

TR Peak [id number]/sRAP [id number] Peak Gr.35mmHg

[id number]



 LEFT VENTRICLE 

The left ventricle is normal size. There is mild to moderate 

concentric left ventricular hypertrophy. Left ventricle systolic 

function is moserately to severely impaired. The Ejection Fraction is 

-30%. There is moderate to severe global hypokinesis.



 RIGHT VENTRICLE 

The right ventricle is normal size.



 ATRIA 

The left atrium is moderately dilated. The right atrium size is 

normal. The interatrial septum is intact with no evidence for an 

atrial septal defect.



 AORTIC VALVE 

The aortic valve is moderately calcified.



 MITRAL VALVE 

The mitral valve is moderately thickened but opens well. Mitral 

annular calcification is moderate. There is calcification of the 

mitral subvalvular structures. Mitral regurgitation is moderate to 

severe.



 TRICUSPID VALVE 

The tricuspid valve is normal in structure. There is mild tricuspid 

regurgitation. There is moderate pulmonary hypertension.



 PULMONIC VALVE 

The pulmonic valve is not well visualized.



 GREAT VESSELS 

The aortic root is normal in size.



 PERICARDIAL EFFUSION 

There is no pericardial effusion.



<Conclusion>

The left ventricle is normal size.

There is mild to moderate concentric left ventricular hypertrophy.

Left ventricle systolic function is moderately to severely impaired.

The Ejection Fraction is -30%.

There is moderate to severe global hypokinesis.

Mitral regurgitation is moderate to severe.

There is mild tricuspid regurgitation.

There is moderate pulmonary hypertension.

## 2017-10-01 NOTE — CP.PCM.DIS
<Jillian Hightower - Last Filed: 10/01/17 16:55>





Provider





- Provider


Date of Admission: 


09/29/17 18:02





Attending physician: 


Bassem Glover MD





Primary care physician: 


Laura Gallo MD





Consults: 





Nephro: Dr Adame


Cardiology: Dr Self





Time Spent in preparation of Discharge (in minutes): 31





Hospital Course





- Lab Results


Lab Results: 


 Most Recent Lab Values











WBC  3.0 10^3/ul (4.5-11.0)  L D 10/01/17  06:00    


 


RBC  3.41 10^6/uL (3.5-6.1)  L  10/01/17  06:00    


 


Hgb  8.0 g/dL (14.0-18.0)  L  10/01/17  06:00    


 


Hct  24.9 % (42.0-52.0)  L  10/01/17  06:00    


 


MCV  73.0 fl (80.0-105.0)  L  10/01/17  06:00    


 


MCH  23.5 pg (25.0-35.0)  L  10/01/17  06:00    


 


MCHC  32.1 g/dl (31.0-37.0)   10/01/17  06:00    


 


RDW  21.9 % (11.5-14.5)  H  10/01/17  06:00    


 


Plt Count  113 10^3/uL (120.0-450.0)  L  10/01/17  06:00    


 


Gran %  53.1 % (50.0-68.0)   10/01/17  06:00    


 


Lymph % (Auto)  22.4 % (22.0-35.0)   10/01/17  06:00    


 


Mono % (Auto)  12.2 % (1.0-6.0)  H  10/01/17  06:00    


 


Eos % (Auto)  11.6 % (1.5-5.0)  H  10/01/17  06:00    


 


Baso % (Auto)  0.7 % (0.0-3.0)   10/01/17  06:00    


 


Gran #  1.61  (1.4-6.5)   10/01/17  06:00    


 


Lymph #  0.7  (1.2-3.4)  L  10/01/17  06:00    


 


Mono #  0.4  (0.1-0.6)   10/01/17  06:00    


 


Eos #  0.4  (0.0-0.7)   10/01/17  06:00    


 


Baso #  0.02 K/mm3 (0.0-2.0)   10/01/17  06:00    


 


PT  26.0 Seconds (9.9-11.8)  H  10/01/17  10:20    


 


INR  2.41  (0.93-1.08)  H  10/01/17  10:20    


 


APTT  58.0 Seconds (23.7-30.8)  H  09/29/17  13:03    


 


Sodium  142 mmol/L (132-148)   10/01/17  06:00    


 


Potassium  3.5 mmol/L (3.6-5.0)  L  10/01/17  06:00    


 


Chloride  98 mmol/L ()   10/01/17  06:00    


 


Carbon Dioxide  32 mmol/L (21-33)   10/01/17  06:00    


 


Anion Gap  16  (10-20)   10/01/17  06:00    


 


BUN  30 mg/dL (7-21)  H  10/01/17  06:00    


 


Creatinine  4.6 mg/dL (0.5-1.4)  H  10/01/17  06:00    


 


Est GFR ( Amer)  16   10/01/17  06:00    


 


Est GFR (Non-Af Amer)  13   10/01/17  06:00    


 


Random Glucose  88 mg/dL ()   10/01/17  06:00    


 


Calcium  9.4 mg/dL (8.4-10.5)   10/01/17  06:00    


 


Phosphorus  4.1 mg/dL (2.5-4.5)   09/30/17  06:00    


 


Magnesium  2.0 mg/dL (1.7-2.2)   09/30/17  06:00    


 


Total Bilirubin  0.7 mg/dL (0.2-1.3)   10/01/17  06:00    


 


AST  32 U/L (17-59)   10/01/17  06:00    


 


ALT  17 U/L (7-56)   10/01/17  06:00    


 


Alkaline Phosphatase  93 U/L ()   10/01/17  06:00    


 


Troponin I  0.14 ng/mL H*  09/30/17  06:00    


 


NT-Pro-B Natriuret Pep  71478 pg/mL (0-450)  H  09/29/17  13:03    


 


Total Protein  7.4 g/dL (5.8-8.3)   10/01/17  06:00    


 


Albumin  3.6 g/dL (3.0-4.8)   10/01/17  06:00    


 


Globulin  3.8 gm/dL  10/01/17  06:00    


 


Albumin/Globulin Ratio  0.9  (1.1-1.8)  L  10/01/17  06:00    


 


Triglycerides  97 mg/dL ()   09/30/17  06:00    


 


Cholesterol  122 mg/dL (130-200)  L  09/30/17  06:00    


 


LDL Cholesterol Direct  37 mg/dL (0-129)   09/30/17  06:00    


 


HDL Cholesterol  36 mg/dL (29-60)   09/30/17  06:00    


 


Lipase  12 U/L ()  L  09/29/17  13:03    














- Hospital Course


Hospital Course: 





52 year old male whose past medical history includes hypertension, 

hyperlipidemia, chronic kidney disease on HD, and anemia was sent here from 

[facility] for evaluation and treatment of elevated blood 

pressures. Patient was found to be in hypertensive emergency with pulmonary 

edema. CT head showed confluent periventricular white matter changes (see full 

report). CXR showed finding consistent with mild congestive heart failure. BNP 

on admission was 68,600. Was given Nitro path and placed on bipap with relief. 

Patient went for HD and tolerated well. Nephrology was consulted and on the 

case. Patient was started on Hydralazine, Clonidine, Procardia, Valsartan, and 

Coreg for hypertension. Cardiology was consulted and on the case. Echo showed 

LVEF of 30%, moderate to severe MR with pulmonary HTN. INR on admission was 

found to be supratherapeutic at 3.68. Patient currently on coumadin, reason 

unknown. INR rechecked prior to discharge and was noted to be 2.41. Will have 

patient continue coumadin 2mg daily. During stay patient was doing well, BPs 

were controlled, patient was tolerating diet. Medications were reconciled. 

Patient was medically stable prior to discharge. All questions and concerns 

were addressed. Patient will need to follow up with Nephro and PMD within 1 

week. 








Discharge Exam





- Head Exam


Head Exam: ATRAUMATIC, NORMAL INSPECTION





- Eye Exam


Eye Exam: EOMI, Normal appearance


Pupil Exam: NORMAL ACCOMODATION





- ENT Exam


ENT Exam: Mucous Membranes Moist





- Neck Exam


Neck exam: Full Rom





- Respiratory Exam


Respiratory Exam: Decreased Breath Sounds, NORMAL BREATHING PATTERN.  absent: 

Rales, Rhonchi, Wheezes





- Cardiovascular Exam


Cardiovascular Exam: REGULAR RHYTHM, +S1, +S2





- GI/Abdominal Exam


GI & Abdominal Exam: Normal Bowel Sounds, Soft.  absent: Guarding, Rigid, 

Tenderness





- Extremities Exam


Additional comments: 





L groin HD catheter in place


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


L arm swelling +4 edema





- Neurological Exam


Neurological exam: Alert, Oriented x3





- Psychiatric Exam


Psychiatric exam: Normal Affect, Normal Mood





- Skin


Skin Exam: Normal Color, Warm





Discharge Plan





- Discharge Medications


Prescriptions: 


cloNIDine [Catapres] 0.1 mg PO BID #28 tab


Vitamin B Complex/Vit C/Folic [Nephro-Carl] 1 tab PO 0800 #14 tab


Warfarin [Coumadin] 2 mg PO 1800 #14 tab





- Follow Up Plan


Condition: GUARDED


Disposition: TRANSF TO SNF


Instructions:  Warfarin (By mouth), Hemodialysis (DC), Dialysis Diet (DC), 

Vitamin K in Foods (DC), Low Sodium Diet (DC), BiPAP (GEN), Warfarin Toxicity (

GEN)


Additional Instructions: 


Patient is clear for discharge to nursing home. Patient to continue HD as 

scheduled. Continue BP medications as prescribed. Low salt diet. Patient will 

need close follow up nephro and PMD. 


Referrals: 


Laura Gallo MD [Primary Care Provider] - 





<Bassem Glover - Last Filed: 10/09/17 14:18>





Provider





- Provider


Date of Admission: 


09/29/17 18:02





Attending physician: 


Bassem Glover MD





Primary care physician: 


Laura Gallo MD





Time Spent in preparation of Discharge (in minutes): 35





Hospital Course





- Lab Results


Lab Results: 


 Most Recent Lab Values











WBC  3.0 10^3/ul (4.5-11.0)  L D 10/01/17  06:00    


 


RBC  3.41 10^6/uL (3.5-6.1)  L  10/01/17  06:00    


 


Hgb  8.0 g/dL (14.0-18.0)  L  10/01/17  06:00    


 


Hct  24.9 % (42.0-52.0)  L  10/01/17  06:00    


 


MCV  73.0 fl (80.0-105.0)  L  10/01/17  06:00    


 


MCH  23.5 pg (25.0-35.0)  L  10/01/17  06:00    


 


MCHC  32.1 g/dl (31.0-37.0)   10/01/17  06:00    


 


RDW  21.9 % (11.5-14.5)  H  10/01/17  06:00    


 


Plt Count  113 10^3/uL (120.0-450.0)  L  10/01/17  06:00    


 


Gran %  53.1 % (50.0-68.0)   10/01/17  06:00    


 


Lymph % (Auto)  22.4 % (22.0-35.0)   10/01/17  06:00    


 


Mono % (Auto)  12.2 % (1.0-6.0)  H  10/01/17  06:00    


 


Eos % (Auto)  11.6 % (1.5-5.0)  H  10/01/17  06:00    


 


Baso % (Auto)  0.7 % (0.0-3.0)   10/01/17  06:00    


 


Gran #  1.61  (1.4-6.5)   10/01/17  06:00    


 


Lymph #  0.7  (1.2-3.4)  L  10/01/17  06:00    


 


Mono #  0.4  (0.1-0.6)   10/01/17  06:00    


 


Eos #  0.4  (0.0-0.7)   10/01/17  06:00    


 


Baso #  0.02 K/mm3 (0.0-2.0)   10/01/17  06:00    


 


PT  26.0 Seconds (9.9-11.8)  H  10/01/17  10:20    


 


INR  2.41  (0.93-1.08)  H  10/01/17  10:20    


 


APTT  58.0 Seconds (23.7-30.8)  H  09/29/17  13:03    


 


Sodium  142 mmol/L (132-148)   10/01/17  06:00    


 


Potassium  3.5 mmol/L (3.6-5.0)  L  10/01/17  06:00    


 


Chloride  98 mmol/L ()   10/01/17  06:00    


 


Carbon Dioxide  32 mmol/L (21-33)   10/01/17  06:00    


 


Anion Gap  16  (10-20)   10/01/17  06:00    


 


BUN  30 mg/dL (7-21)  H  10/01/17  06:00    


 


Creatinine  4.6 mg/dL (0.5-1.4)  H  10/01/17  06:00    


 


Est GFR ( Amer)  16   10/01/17  06:00    


 


Est GFR (Non-Af Amer)  13   10/01/17  06:00    


 


Random Glucose  88 mg/dL ()   10/01/17  06:00    


 


Hemoglobin A1c  4.0 % (4.2-6.5)  L  09/30/17  06:00    


 


Calcium  9.4 mg/dL (8.4-10.5)   10/01/17  06:00    


 


Phosphorus  4.1 mg/dL (2.5-4.5)   09/30/17  06:00    


 


Magnesium  2.0 mg/dL (1.7-2.2)   09/30/17  06:00    


 


Total Bilirubin  0.7 mg/dL (0.2-1.3)   10/01/17  06:00    


 


AST  32 U/L (17-59)   10/01/17  06:00    


 


ALT  17 U/L (7-56)   10/01/17  06:00    


 


Alkaline Phosphatase  93 U/L ()   10/01/17  06:00    


 


Troponin I  0.14 ng/mL H*  09/30/17  06:00    


 


NT-Pro-B Natriuret Pep  57930 pg/mL (0-450)  H  09/29/17  13:03    


 


Total Protein  7.4 g/dL (5.8-8.3)   10/01/17  06:00    


 


Albumin  3.6 g/dL (3.0-4.8)   10/01/17  06:00    


 


Globulin  3.8 gm/dL  10/01/17  06:00    


 


Albumin/Globulin Ratio  0.9  (1.1-1.8)  L  10/01/17  06:00    


 


Triglycerides  97 mg/dL ()   09/30/17  06:00    


 


Cholesterol  122 mg/dL (130-200)  L  09/30/17  06:00    


 


LDL Cholesterol Direct  37 mg/dL (0-129)   09/30/17  06:00    


 


HDL Cholesterol  36 mg/dL (29-60)   09/30/17  06:00    


 


Lipase  12 U/L ()  L  09/29/17  13:03    














Attending/Attestation





- Attestation


I have personally seen and examined this patient.: Yes


I have fully participated in the care of the patient.: Yes


I have reviewed all pertinent clinical information, including history, physical 

exam and plan: Yes


Notes (Text): 








I have seen and examined the patient at bedside. Agree with the note above with 

the following additions/ exceptions: Briefly this is 52 year old male with 

history of HTN, dyslipidemia, CKD on HD and anemia who was sent from [facility] for evaluation of hypertensive urgency with pulmonary edema which has 

resolved now. He is on multiple BP meds. BP is stable now and he denies any 

complaints. He also underwent HD yesterday. INR is therapeutic. He is on 

coumadin for unknown reason. Upon discharge patient will follow up with Dr Gallo and Dr Adame.


Dr Bassem Glover

## 2017-10-01 NOTE — CP.PCM.PN
Subjective





- Date & Time of Evaluation


Date of Evaluation: 10/01/17


Time of Evaluation: 09:00





- Subjective


Subjective: 


Follow up Nephrology Consultation Note





Assessment: Stable


HTN urgency with pulm edema


Hypertensive Chronic Kidney Disease (I12.0)


End stage renal disease (N18.6) dependence on hemodialysis (Z99.2) (MWF) via 

permacath


Anemia (D64.9), Hyperphosphatemia (E83.39), Secondary Hyperparathyroidism (E21.1

), HTN (I12.0)





Plan:


No acute need for dialysis today. Will plan for dialysis tomorrow. Continue 

with Nephrovite 1 tab/day. 


PRBC as needed for anemia. ordered for aransep


Continue with phos binders 


BP control with meds as ordered. Patient on RAAS blocker as Diovan


Glycemic control, Dialysis consistent diet


Further work up/management as per primary team


Dose meds/antibiotics (if needed) for ESRD status. Avoid fleets enema/magnesium 

based laxatives.


pt stable from renal perspective for dc to rehab. d/w team





Thanks for allowing me to participate in care of your patient. Will follow 

patient with you. Please call if any Qs


Dr Harsh Adame


Office: [phone] Cell: [phone] Fax: [phone]





Subjective: Noted events overnight. Patients feels okay. Denies chest pain, 

palpitation, shortness of breath, leg swelling. No urinary complaints





Physical Examination:       


General Appearance: Comfortable, in no acute respiratory distress, co-

operative. 


Vitals reviewed and noted as below


Lungs: Normal respiratory rate/effort. Breath sounds bilateral equal and clear


Heart: Normal rate. s1s2 normal. No rub or gallop. 


Extremities: no edema. BEBE chronic edema RUE thrombosed aneurysm


Neurological: Patient is alert, awake and oriented to person, place and time. 

No focal deficit. Strength bilateral appropriate and equal


Skin: Warm and dry. Normal turgor. No rash. Palpitation: Normal elasticity for 

age


Abdomen: Abdomen is soft. Bowel sounds +. There is no abdominal tenderness, no 

guarding/rigidity or organomegaly


: kidney or bladder not palpable


Access: left fem permacath





Labs/imaging reviewed.


Past medical history, past surgical history, family history, social history, 

allergy reviewed 








Objective





- Vital Signs/Intake and Output


Vital Signs (last 24 hours): 


 











Temp Pulse Resp BP Pulse Ox


 


 98.5 F   83   20   154/83 H  110 H


 


 10/01/17 12:00  10/01/17 12:00  10/01/17 12:00  10/01/17 12:00  10/01/17 06:00











- Medications


Medications: 


 Current Medications





Aspirin (Ecotrin)  81 mg PO DAILY [facility]


   Last Admin: 10/01/17 11:02 Dose:  81 mg


Atorvastatin Calcium (Lipitor)  20 mg PO DIN [facility]


   Last Admin: 09/30/17 19:04 Dose:  20 mg


Calcium Acetate (Phoslo)  1,334 mg PO TID [facility]


   Last Admin: 10/01/17 13:08 Dose:  1,334 mg


Carvedilol (Coreg)  25 mg PO BID [facility]


   Last Admin: 10/01/17 11:02 Dose:  25 mg


Clonidine HCl (Catapres)  0.1 mg PO BID [facility]


   Last Admin: 10/01/17 11:01 Dose:  0.1 mg


Darbepoetin Lauro (Aranesp)  40 mcg IVP ONCE ONE


   Stop: 10/02/17 10:04


Divalproex Sodium (Depakote Sprinkles)  125 mg PO BID [facility]


   PRN Reason: Protocol


   Last Admin: 10/01/17 11:01 Dose:  125 mg


Heparin Sodium (Porcine) (Heparin)  5,000 units SC Q12 [facility]


   PRN Reason: Protocol


   Last Admin: 10/01/17 11:00 Dose:  5,000 units


Hydralazine HCl (Apresoline)  100 mg PO BID [facility]


   Last Admin: 10/01/17 11:01 Dose:  100 mg


Nifedipine (Procardia Xl)  60 mg PO DAILY [facility]


   Last Admin: 10/01/17 11:01 Dose:  60 mg


Non-Formulary Medication (Amino Acids/Protein Hydr/Fiber [Pro-Stat Renal Care 

Liquid])  30 ml PO BID [facility]


   Last Admin: 10/01/17 10:23 Dose:  Not Given


Pantoprazole Sodium (Protonix Ec Tab)  40 mg PO DAILY [facility]


   Last Admin: 10/01/17 11:02 Dose:  40 mg


Valsartan (Diovan)  320 mg PO DAILY [facility]


   Last Admin: 10/01/17 11:02 Dose:  320 mg


Vitamin B Complex/Vit C/Folic Acid (Nephro-Carl)  1 tab PO 0800 [facility]


   Last Admin: 10/01/17 08:05 Dose:  1 tab


Warfarin Sodium (Coumadin)  2 mg PO 1800 [facility]


   PRN Reason: Protocol











- Labs


Labs: 


 





 10/01/17 06:00 





 10/01/17 06:00 





 











PT  26.0 Seconds (9.9-11.8)  H  10/01/17  10:20    


 


INR  2.41  (0.93-1.08)  H  10/01/17  10:20    


 


APTT  58.0 Seconds (23.7-30.8)  H  09/29/17  13:03

## 2017-10-02 NOTE — PN
DATE:  09/30/2017



CHIEF COMPLAINT:  None at this time.



HISTORY OF PRESENT ILLNESS AND REVIEW OF SYSTEMS:  The patient admitted

overnight because of respiratory distress and hypertensive urgency with

pulmonary edema.  He got emergency dialysis yesterday and he is _____

followup today.  The patient at this time feels better.  Denies any nausea,

vomiting, chest pain, palpitation, shortness of breath, cough, any leg

swelling.  He has been on dialysis for many years.



PHYSICAL EXAMINATION

GENERAL:  The patient is comfortable.

VITAL SIGNS:  Afebrile.  Pulse 78, blood pressure 163/86, saturations are

well maintained, respirations 18.

CARDIOVASCULAR:  S1 and S2 normal.  No murmur, rub or gallop.

LUNGS:  Bilateral vesicular breath sounds with few bibasilar crackles.

ABDOMEN:  Soft and nontender.  No organomegaly.

EXTREMITIES:  No edema.

VASCULAR:  Access the left PermCath in femoral area.  The patient has

lymphedema of left upper extremity.



LABORATORY DATA:  Workup showed white blood cell count is 4.5, hemoglobin

is 8.2, platelet count is 128.  INR was 3.6.  Sodium is 142, potassium 4.1,

creatinine is 5.7.  His phosphorus is 4.1.  BNP was very high at 68,000. 

Chest x-ray had shown pulmonary edema.



CURRENT MEDICATIONS:  Reviewed.



ASSESSMENT:

1.  Hypertensive urgency with pulmonary edema.

2.  End-stage renal disease, on hemodialysis via left femoral PermCath

Monday, Wednesday and Friday.

3.  Uncontrolled severe hypertension, hyperlipidemia.

4.  Anemia.



RECOMMENDATIONS:  We will plan for extra session of dialysis today.  We

will give Aranesp next week.  Continue with blood pressure medication at

this time.  He is on multiple antihypertensives including ARB, added

multivitamin per day.  May increase nifedipine up to twice daily if blood

pressure stays elevated.  He is also on phosphorus binder. His phosphorous

is under good control.  All questions were answered.  Cardiology is also

following on the patient.



Thank you for the consultation.





__________________________________________

Harsh Adame MD



DD:  09/30/2017 15:42:36

DT:  09/30/2017 17:23:17

Job # [id number]

## 2017-10-03 NOTE — PQF CHF
***** This form is a permanent part of the medical record*****

DR. WARREN,



Please document type of CHF.





Clarification of your documentation is requested to better reflect the severity 
of illness and intensity of treatment of your patient.  



Indicators present   



[x] Diagnosis of CHF and/or history of CHF            



[x] BNP > 200                        

 

[x] Imaging Finding of Pulmonary Edema /Pleural Effusions      

 

[x] Fluid/Volume Overload                  

 

[x] Pitting edema                     



[x] Ejection Fraction < 40% (Indicative of Systolic Heart Failure)      



[] Ejection Fraction > 40% (Indicative of Diastolic Heart Failure)   

 

[x] Dyspnea / Orthopenea / Paroxysmal Nocturnal Dyspnea      

 

[] Other:

                  

Location in the medical record that reflects the above clinical findings: [x H&
p and progress notes]





Treatment Provided: [Hemodialysis]



PHYSICIAN'S RESPONSE





Based on your medical judgment of the clinical indicators outlined above, are 
you treating this patient for a known or suspected: 



[x] Acute CHF            [x] Systolic   []  Diastolic    []  Combined

 

[] Chronic CHF         [] Systolic    [] Diastolic     [] Combined



[] Acute on Chronic CHF       []Systolic    [] Diastolic     [] Combined



[] CHF due hypertension       [] Acute  systolic  []Chronic systolic  [] Acute/
chronic systolic

 

[] Other, please indicate: []



[] If Unable to Determine, please check the box, sign and date.   



Present On Admission (POA) Indicator:



[x] Present at the time of admission



[] Not present at the time of admission 



[] Clinically Undetermined







In responding to this query, please exercise your independent professional 
judgment.  The fact that a question is asked does not imply that any particular 
answer is desired or expected.  Thank you for your clarification on this 
documentation.





If you have any questions please call:[ ]

* Thank you,

   [X ] IVÁN

   HIM Coder
LINO

## 2019-01-18 ENCOUNTER — HOSPITAL ENCOUNTER (EMERGENCY)
Dept: HOSPITAL 42 - ED | Age: 54
Discharge: HOME | End: 2019-01-18
Payer: MEDICAID

## 2019-01-18 VITALS
SYSTOLIC BLOOD PRESSURE: 168 MMHG | TEMPERATURE: 98.2 F | DIASTOLIC BLOOD PRESSURE: 91 MMHG | HEART RATE: 82 BPM | OXYGEN SATURATION: 96 %

## 2019-01-18 VITALS — RESPIRATION RATE: 18 BRPM

## 2019-01-18 VITALS — BODY MASS INDEX: 22.7 KG/M2

## 2019-01-18 DIAGNOSIS — N18.6: ICD-10-CM

## 2019-01-18 DIAGNOSIS — I50.9: ICD-10-CM

## 2019-01-18 DIAGNOSIS — I13.2: Primary | ICD-10-CM

## 2019-01-18 DIAGNOSIS — Z99.2: ICD-10-CM

## 2019-01-18 DIAGNOSIS — E11.22: ICD-10-CM

## 2019-01-18 LAB
ALBUMIN SERPL-MCNC: 4.1 G/DL (ref 3–4.8)
ALBUMIN/GLOB SERPL: 0.9 {RATIO} (ref 1.1–1.8)
ALT SERPL-CCNC: 14 U/L (ref 7–56)
APTT BLD: 34.4 SECONDS (ref 25.1–36.5)
AST SERPL-CCNC: 24 U/L (ref 17–59)
BASOPHILS # BLD AUTO: 0.03 K/MM3 (ref 0–2)
BASOPHILS NFR BLD: 0.5 % (ref 0–3)
BUN SERPL-MCNC: 72 MG/DL (ref 7–21)
CALCIUM SERPL-MCNC: 9.9 MG/DL (ref 8.4–10.5)
EOSINOPHIL # BLD: 0.3 10*3/UL (ref 0–0.7)
EOSINOPHIL NFR BLD: 4.8 % (ref 1.5–5)
ERYTHROCYTE [DISTWIDTH] IN BLOOD BY AUTOMATED COUNT: 20.9 % (ref 11.5–14.5)
GFR NON-AFRICAN AMERICAN: 6
GRANULOCYTES # BLD: 4.82 10*3/UL (ref 1.4–6.5)
GRANULOCYTES NFR BLD: 77.6 % (ref 50–68)
HGB BLD-MCNC: 12 G/DL (ref 14–18)
INR PPP: 1.15
LYMPHOCYTES # BLD: 0.7 10*3/UL (ref 1.2–3.4)
LYMPHOCYTES NFR BLD AUTO: 10.8 % (ref 22–35)
MCH RBC QN AUTO: 25 PG (ref 25–35)
MCHC RBC AUTO-ENTMCNC: 32.3 G/DL (ref 31–37)
MCV RBC AUTO: 77.3 FL (ref 80–105)
MONOCYTES # BLD AUTO: 0.4 10*3/UL (ref 0.1–0.6)
MONOCYTES NFR BLD: 6.3 % (ref 1–6)
PLATELET # BLD: 199 10^3/UL (ref 120–450)
PROTHROMBIN TIME: 13.2 SECONDS (ref 9.4–12.5)
RBC # BLD AUTO: 4.8 10^6/UL (ref 3.5–6.1)
WBC # BLD AUTO: 6.2 10^3/UL (ref 4.5–11)

## 2019-01-18 NOTE — ED PDOC
Arrival/HPI





- General


Chief Complaint: ENT Problem


Historian: Patient, Other (ER nurse)





- History of Present Illness


Narrative History of Present Illness (Text): 





01/18/19 16:08


A 54 y/o M w/ pmhx of end stage renal disease presents to the emergency depart

ment sent in by dialysis center for hypertension and epistaxis since earlier 

today. Per ER nurse, while receiving dialysis, the patient blood pressure was 

high and was experiencing epistaxis. ER nurse reports, when EMS arrived 

epistaxis was resolved and blood pressure was measured to be normal. Per ER 

nurse, [place] at [facility] [place] but personally requested to come to [facility] [place]. 

Patient's sister called for the patient to be moved to [facility] [place], however patient's 

blood pressure in ER is 140/80. Patient denies any shortness of breath, chest 

pain, diarrhea, nausea, vomiting,  urinary symptoms, headache, dizziness, or any

other complaints. 





PMD: Des Courtney





Time/Duration: 4-6 hours (earlier today)


Symptom Onset: Gradual


Symptom Course: Unchanged


Activities at Onset: Light


Context: Other (dialysis center)





Past Medical History





- Provider Review


Nursing Documentation Reviewed: Yes





- Infectious Disease


Hx of Infectious Diseases: None





- Tetanus Immunization


Tetanus Immunization: Unknown





- Cardiac


Hx Cardiac Disorders: Yes


Hx Congestive Heart Failure: Yes


Hx Hypertension: Yes





- Pulmonary


Hx Respiratory Disorders: No





- Neurological


Hx Neurological Disorder: Yes


Hx Seizures: Yes





- HEENT


Hx HEENT Disorder: No





- Renal


Hx Renal Disorder: Yes


Hx Dialysis: Yes


Date of Last Dialysis Treatment: 09/27/17


Hx Renal Failure: Yes (ESRD, CKD, on HD for past 30 years)





- Endocrine/Metabolic


Hx Endocrine Disorders: Yes


Hx Diabetes Mellitus Type 2: Yes





- Hematological/Oncological


Hx Blood Disorders: Yes


Hx Anemia: Yes





- Integumentary


Hx Dermatological Disorder: No





- Musculoskeletal/Rheumatological


Hx Musculoskeletal Disorders: Yes


Hx Falls: Yes


Other/Comment: lt arm swelling





- Gastrointestinal


Hx Gastrointestinal Disorders: Yes


Hx Diverticulitis: Yes





- Genitourinary/Gynecological


Hx Genitourinary Disorders: No





- Psychiatric


Hx Psychophysiologic Disorder: No


Hx Substance Use: No





- Past Surgical History


Past Surgical History: Unable to Obtain





- Surgical History


Hx Cardiac Catheterization: Yes


Hx Vascular Access Device: Yes


Other/Comment: right upper arm and left forearm av shunts not in use, pt has 

perm cath to L groin





- Anesthesia


Hx Anesthesia: Yes


Hx Anesthesia Reactions: No


Hx Malignant Hyperthermia: No





- Suicidal Assessment


Feels Threatened In Home Enviroment: No





Family/Social History





- Physician Review


Nursing Documentation Reviewed: Yes


Family/Social History: No Known Family HX


Smoking Status: Never Smoked


Hx Alcohol Use: No


Hx Substance Use: No





Allergies/Home Meds


Allergies/Adverse Reactions: 


Allergies





No Known Allergies Allergy (Verified 09/29/17 12:08)


   








Home Medications: 


                                    Home Meds











 Medication  Instructions  Recorded  Confirmed


 


Aspirin [Aspirin EC] 81 mg PO DAILY 09/21/15 09/29/17


 


Atorvastatin [Lipitor] 20 mg PO DIN 09/21/15 09/29/17


 


Carvedilol [Coreg] 25 mg PO BID 09/21/15 09/29/17


 


Clopidogrel [Plavix] 75 mg PO DAILY 09/21/15 09/29/17


 


Amino Acids/Protein Hydr/Fiber 30 ml PO BID 09/29/17 09/29/17





[Pro-Stat Renal Care Liquid]   


 


Calcium Acetate [Phoslo] 2 cap PO TID 09/29/17 09/29/17


 


Divalproex Sodium 1 cap PO BID 09/29/17 09/29/17


 


Famotidine [Pepcid] 20 mg PO DAILY 09/29/17 09/29/17


 


NIFEdipine ER [Procardia XL] 1 tab PO DAILY 09/29/17 09/29/17


 


Valsartan [Diovan] 320 mg PO DAILY 09/29/17 09/29/17


 


hydrALAZINE [Apresoline] 100 mg PO BID 09/29/17 09/29/17














Review of Systems





- Physician Review


All systems were reviewed & negative as marked: Yes





- Review of Systems


Respiratory: absent: SOB


Cardiovascular: absent: Chest Pain


Gastrointestinal: absent: Diarrhea, Nausea, Vomiting


Genitourinary Male: absent: Urinary Output Changes


Neurological: absent: Headache, Dizziness





Physical Exam


Vital Signs Reviewed: Yes





Vital Signs











  Temp Pulse Resp BP Pulse Ox


 


 01/18/19 15:12  98.5 F  78  18  143/92 H  93 L











Temperature: Afebrile


Blood Pressure: Hypertensive


Pulse: Regular


Respiratory Rate: Normal


Appearance: Positive for: Non-Toxic





- Systems Exam


Head: Present: Atraumatic, Normocephalic


Pupils: Present: PERRL


Extroacular Muscles: Present: EOMI


Conjunctiva: Present: Normal


Mouth: Present: Dry


Respiratory/Chest: Present: Clear to Auscultation, Good Air Exchange.  No: 

Respiratory Distress, Accessory Muscle Use


Cardiovascular: Present: Regular Rate and Rhythm, Normal S1, S2.  No: Murmurs


Abdomen: No: Tenderness, Distention, Peritoneal Signs


Upper Extremity: Present: Normal Inspection.  No: Cyanosis, Edema


Lower Extremity: Present: Normal Inspection.  No: Edema


Neurological: Present: GCS=15, CN II-XII Intact, Speech Normal, Other (AV 

fistula with palpable thrill in left htigh)


Skin: Present: Warm, Dry, Normal Color.  No: Rashes


Psychiatric: Present: Alert, Oriented x 3, Normal Insight, Normal Concentration





Medical Decision Making


ED Course and Treatment: 





01/18/19 16:10


Impression: 


54 y/o M  presents to the emergency department for hypertension and epistaxis.





Differential Diagnosis included but are not limited to:  


--Hypertensive urgency emergency


--Anterior chamber epistaxis


--Volume 





Plan:


--Labs


--Nephrology consult


-- Reassess and disposition





Prior Visits:


Notes and results from previous visits were reviewed. 





Progress Notes:





01/18/19 16:20


Spoke to dialysis center who refuses to take patient because they do not have a 

spot for the patient. Call placed to Dr. Abraham(nephrology).


 


01/18/19 17:09


Labs reviewed with no hyperkalemia noted. Potassium noted to be 4.8 with isola

abdoul elevated BUN/Creatinine of 72/9.1. 


Discussed case with Dr. Adame(nephrology) who states patient may return back to 

nursing home with dialysis to be performed tomorrow at 2PM. Patient and sister 

informed of plan for discharge. 








- Lab Interpretations


Lab Results: 














                                 01/18/19 16:34 





                                 01/18/19 16:34 





                                   Lab Results





01/18/19 16:34: Sodium 138, Potassium 4.8, Chloride 97 L, Carbon Dioxide 27, 

Anion Gap 19, BUN 72 H, Creatinine 9.4 H*, Est GFR ( Amer) 7, Est GFR 

(Non-Af Amer) 6, Random Glucose 92, Calcium 9.9, Total Bilirubin 0.5, AST 24, 

ALT 14, Alkaline Phosphatase 131 H D, Total Protein 8.4 H, Albumin 4.1, Globulin

4.3, Albumin/Globulin Ratio 0.9 L


01/18/19 16:34: PT 13.2 H, INR 1.15, APTT 34.4


01/18/19 16:34: WBC 6.2, RBC 4.80, Hgb 12.0 L D, Hct 37.1 L, MCV 77.3 L D, MCH 

25.0, MCHC 32.3, RDW 20.9 H, Plt Count 199, Gran % 77.6 H, Lymph % (Auto) 10.8 L

, Mono % (Auto) 6.3 H, Eos % (Auto) 4.8, Baso % (Auto) 0.5, Gran # 4.82, Lymph #

(Auto) 0.7 L, Mono # (Auto) 0.4, Eos # (Auto) 0.3, Baso # (Auto) 0.03








I have reviewed the lab results: Yes





- Scribe Statement


The provider has reviewed the documentation as recorded by the Scribe





Chantel Fernandes





All medical record entries made by the Scribe were at my direction and 

personally dictated by me. I have reviewed the chart and agree that the record 

accurately reflects my personal performance of the history, physical exam, 

medical decision making, and the department course for this patient. I have also

personally directed, reviewed, and agree with the discharge instructions and d

isposition.








Disposition/Present on Arrival





- Present on Arrival


Any Indicators Present on Arrival: No


History of DVT/PE: No


History of Uncontrolled Diabetes: No


Urinary Catheter: No


History of Decub. Ulcer: No


History Surgical Site Infection Following: None





- Disposition


Have Diagnosis and Disposition been Completed?: Yes


Diagnosis: 


 Dialysis patient, Hypertension





Disposition: HOME/ ROUTINE


Disposition Time: 17:16


Patient Plan: Discharge


Condition: STABLE


Discharge Instructions (ExitCare):  High Blood Pressure (DC), Malignant 

Hypertension (DC)


Print Language: ENGLISH


Additional Instructions: 





All medical record entries made by the Scribe were at my direction and 

personally dictated by me. I have reviewed the chart and agree that the record 

accurately reflects my personal performance of the history, physical exam, 

medical decision making, and the department course for this patient. I have also

personally directed, reviewed, and agree with the discharge instructions and 

disposition.





Please make arrangements for dialysis tomorrow at 2PM


Referrals: 


Des Cabrales MD [Primary Care Provider] - Follow up with primary


Forms:  [employer] (English)

## 2019-02-05 ENCOUNTER — HOSPITAL ENCOUNTER (OUTPATIENT)
Dept: HOSPITAL 31 - C.CTH | Age: 54
End: 2019-02-05
Payer: MEDICAID

## 2019-04-11 ENCOUNTER — HOSPITAL ENCOUNTER (OUTPATIENT)
Dept: HOSPITAL 31 - C.VASC | Age: 54
End: 2019-04-11
Payer: MEDICAID

## 2019-04-11 DIAGNOSIS — I73.9: Primary | ICD-10-CM
